# Patient Record
Sex: MALE | Race: AMERICAN INDIAN OR ALASKA NATIVE | NOT HISPANIC OR LATINO | URBAN - METROPOLITAN AREA
[De-identification: names, ages, dates, MRNs, and addresses within clinical notes are randomized per-mention and may not be internally consistent; named-entity substitution may affect disease eponyms.]

---

## 2018-09-14 ENCOUNTER — OFFICE VISIT - HEALTHEAST (OUTPATIENT)
Dept: GERIATRICS | Facility: CLINIC | Age: 53
End: 2018-09-14

## 2018-09-14 ENCOUNTER — AMBULATORY - HEALTHEAST (OUTPATIENT)
Dept: ADMINISTRATIVE | Facility: CLINIC | Age: 53
End: 2018-09-14

## 2018-09-14 DIAGNOSIS — G11.9 CEREBELLAR ATAXIA (H): ICD-10-CM

## 2018-09-14 DIAGNOSIS — Z72.0 TOBACCO ABUSE: ICD-10-CM

## 2018-09-14 DIAGNOSIS — R25.1 TREMOR: ICD-10-CM

## 2018-09-14 DIAGNOSIS — J69.0 ASPIRATION PNEUMONIA (H): ICD-10-CM

## 2018-09-14 DIAGNOSIS — G93.41 METABOLIC ENCEPHALOPATHY: ICD-10-CM

## 2018-09-14 DIAGNOSIS — F10.939 ALCOHOL WITHDRAWAL (H): ICD-10-CM

## 2018-09-17 ENCOUNTER — RECORDS - HEALTHEAST (OUTPATIENT)
Dept: LAB | Facility: CLINIC | Age: 53
End: 2018-09-17

## 2018-09-17 ENCOUNTER — OFFICE VISIT - HEALTHEAST (OUTPATIENT)
Dept: GERIATRICS | Facility: CLINIC | Age: 53
End: 2018-09-17

## 2018-09-17 DIAGNOSIS — F10.939 ALCOHOL WITHDRAWAL (H): ICD-10-CM

## 2018-09-17 DIAGNOSIS — N30.01 ACUTE CYSTITIS WITH HEMATURIA: ICD-10-CM

## 2018-09-17 DIAGNOSIS — G11.9 CEREBELLAR ATAXIA (H): ICD-10-CM

## 2018-09-17 DIAGNOSIS — Z72.0 TOBACCO ABUSE: ICD-10-CM

## 2018-09-17 DIAGNOSIS — E87.6 HYPOKALEMIA: ICD-10-CM

## 2018-09-17 DIAGNOSIS — R09.02 HYPOXIA: ICD-10-CM

## 2018-09-17 DIAGNOSIS — D53.9 MACROCYTIC ANEMIA: ICD-10-CM

## 2018-09-17 DIAGNOSIS — J69.0 ASPIRATION PNEUMONIA (H): ICD-10-CM

## 2018-09-17 DIAGNOSIS — R25.1 TREMOR: ICD-10-CM

## 2018-09-17 DIAGNOSIS — G93.41 METABOLIC ENCEPHALOPATHY: ICD-10-CM

## 2018-09-17 LAB
ALBUMIN SERPL-MCNC: 3.2 G/DL (ref 3.5–5)
ALP SERPL-CCNC: 204 U/L (ref 45–120)
ALT SERPL W P-5'-P-CCNC: 55 U/L (ref 0–45)
ANION GAP SERPL CALCULATED.3IONS-SCNC: 8 MMOL/L (ref 5–18)
AST SERPL W P-5'-P-CCNC: 114 U/L (ref 0–40)
BILIRUB SERPL-MCNC: 0.7 MG/DL (ref 0–1)
BUN SERPL-MCNC: 7 MG/DL (ref 8–22)
CALCIUM SERPL-MCNC: 9.7 MG/DL (ref 8.5–10.5)
CHLORIDE BLD-SCNC: 105 MMOL/L (ref 98–107)
CO2 SERPL-SCNC: 24 MMOL/L (ref 22–31)
CREAT SERPL-MCNC: 0.55 MG/DL (ref 0.7–1.3)
GFR SERPL CREATININE-BSD FRML MDRD: >60 ML/MIN/1.73M2
GLUCOSE BLD-MCNC: 84 MG/DL (ref 70–125)
HGB BLD-MCNC: 10.5 G/DL (ref 14–18)
POTASSIUM BLD-SCNC: 3.7 MMOL/L (ref 3.5–5)
PROT SERPL-MCNC: 8.1 G/DL (ref 6–8)
SODIUM SERPL-SCNC: 137 MMOL/L (ref 136–145)

## 2018-09-21 ENCOUNTER — OFFICE VISIT - HEALTHEAST (OUTPATIENT)
Dept: GERIATRICS | Facility: CLINIC | Age: 53
End: 2018-09-21

## 2018-09-21 DIAGNOSIS — F10.939 ALCOHOL WITHDRAWAL (H): ICD-10-CM

## 2018-09-21 DIAGNOSIS — Z72.0 TOBACCO ABUSE: ICD-10-CM

## 2018-09-21 DIAGNOSIS — J69.0 ASPIRATION PNEUMONIA (H): ICD-10-CM

## 2018-09-21 DIAGNOSIS — G11.9 CEREBELLAR ATAXIA (H): ICD-10-CM

## 2018-09-25 ENCOUNTER — AMBULATORY - HEALTHEAST (OUTPATIENT)
Dept: GERIATRICS | Facility: CLINIC | Age: 53
End: 2018-09-25

## 2021-06-02 VITALS — WEIGHT: 181.9 LBS | BODY MASS INDEX: 24.67 KG/M2

## 2021-06-02 VITALS — BODY MASS INDEX: 25.23 KG/M2 | WEIGHT: 186 LBS

## 2021-06-20 NOTE — PROGRESS NOTES
Code Status:  FULL CODE  Visit Type: H & P     Facility:  [facility]F [[id number]]      Facility Type: SNF (Skilled Nursing Facility, TCU)    History of Present Illness:   Hospital Admission Date: December 5, 2018 [facility] hospital Discharge Date: September 13, 2018  Facility Admission Date: December 13, 2018 [facility] transitional care unit    Tj Breaux is a 53 y.o. male patient was brought to the emergency department via EMS due to acute alcohol intoxication and confusion.  There he was found to be hypoxic with an O2 sat of 90% on 2 L in the chest x-ray demonstrated bilateral upper lobe infiltrates suggestive of aspiration pneumonitis.  He was given dose of IV ceftriaxone in the emergency department and was found to have elevated liver transaminases hypokalemia and macrocytic anemia.  His mother reported that she visits him daily and for the past 2 months he has been gradually declining.  He is not eaten lost weight and has been drinking approximately 1 L of [employer] per [employer] and smoking 1-2 packs of cigarettes a day.  He is disabled and is not worked for years.  His balance has significantly deteriorated.  He had a 2-1/2 year history of sobriety after participating in therapy at [facility] approximately 6 years ago.  He reports that he has had weeks of sobriety in the past year but his mother states that this is not true.  Only medicine he was taking was a steroid cream.    Hospital course; he was admitted with alcohol withdrawal acute treatment program called [facility].  This was only 24 hours for requirements for Valium.  He was treated for suspected aspiration pneumonitis and acute cystitis with Ceftin.  His urine culture grew out enterococcus.  He initially had some signs of urinary retention which was treated with him a lucent and he subsequently had been voiding well with no need for the 10 a lucent.  He received initial doses of rabies vaccine because of the  concern about bats in his house.  His delirium resolved but he continued to demonstrate extremely poor cerebellar ataxia.  He was polite and cooperative.    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      ETOH abuse      Gastritis      General weakness      Hypokalemia      Hypoxemia      Impulse disorder      Intracranial injury (H) 05/14/2008    with open intracranial wound     Macrocytic anemia      Major depressive disorder      Metabolic encephalopathy      Nicotine dependence      Pneumonitis     d/t inhalation of food and vomit - aspiration pneumonia     Psoriasis      Rabies exposure      Tobacco dependence      UTI (urinary tract infection)      Past Surgical History:   Procedure Laterality Date     jaw wiring  1994     SINUS SURGERY  10/11/2010     TONSILLECTOMY AND ADENOIDECTOMY       No family history on file.  Social History     Social History     Marital status: N/A     Spouse name: N/A     Number of children: N/A     Years of education: N/A     Occupational History     Not on file.     Social History Main Topics     Smoking status: Current Every Day Smoker     Packs/day: 0.50     Years: 30.00     Types: Cigarettes     Smokeless tobacco: Never Used     Alcohol use 105.0 oz/week     175 Shots of liquor per week      Comment: 1L whiskey per day     Drug use: Not on file     Sexual activity: Not on file     Other Topics Concern     Not on file     Social History Narrative     No narrative on file     Additional Geriatric Review patient lives alone in an apartment his mother visits him daily.  Has a stated past alcohol treatment with some  success and then recidivism.  He is disabled and has not worked for years.  He also has had a history of some rectal dysfunction.  Current Outpatient Prescriptions   Medication Sig Dispense Refill     acetaminophen (TYLENOL) 650 MG CR tablet Take 650 mg by mouth every 4 (four) hours as needed for pain.       multivitamin therapeutic tablet Take 1 tablet by mouth daily. Take for 30 days       nicotine (NICODERM CQ) 21 mg/24 hr Place 1 patch on the skin daily.        thiamine (VITAMIN B-1) 100 MG tablet Take 100 mg by mouth daily. Take for 30 days       No current facility-administered medications for this visit.      No Known Allergies    There is no immunization history on file for this patient.      Review of Systems   Constitutional: Negative for activity change, chills, diaphoresis, fatigue and fever.   HENT: Negative for ear pain, hearing loss, sinus pressure, sore throat and trouble swallowing.    Eyes: Negative for discharge and visual disturbance.   Respiratory: Negative for cough, shortness of breath and wheezing.    Cardiovascular: Negative for chest pain, palpitations and leg swelling.   Gastrointestinal: Negative for abdominal pain, constipation and diarrhea.   Endocrine: Negative for cold intolerance and heat intolerance.   Genitourinary: Negative for difficulty urinating, dysuria, flank pain, frequency and urgency.   Musculoskeletal: Negative for arthralgias, back pain, myalgias and neck stiffness.   Allergic/Immunologic: Negative for immunocompromised state.   Neurological: Negative for dizziness, tremors, syncope, speech difficulty, weakness, light-headedness, numbness and headaches.   Psychiatric/Behavioral: The patient is nervous/anxious.         Physical Exam   Constitutional: He is oriented to person, place, and time. He appears well-developed and well-nourished.   HENT:   Head: Normocephalic and atraumatic.   Right Ear: External ear normal.   Left Ear: External ear normal.   Nose: Nose normal.    Mouth/Throat: Oropharynx is clear and moist.   Eyes: Conjunctivae and EOM are normal. Pupils are equal, round, and reactive to light. Right eye exhibits no discharge. Left eye exhibits no discharge.   Neck: Neck supple. No JVD present. No tracheal deviation present. No thyromegaly present.   Cardiovascular: Regular rhythm, normal heart sounds and intact distal pulses.    No murmur heard.  Tachycardic   Pulmonary/Chest: Effort normal and breath sounds normal. No respiratory distress. He has no wheezes. He has no rales. He exhibits no tenderness.   Abdominal: He exhibits no distension and no mass. There is no tenderness. There is no guarding.   Musculoskeletal: Normal range of motion. He exhibits no edema or tenderness.   Lymphadenopathy:     He has no cervical adenopathy.   Neurological: He is alert and oriented to person, place, and time. He has normal reflexes. No cranial nerve deficit. Coordination normal.   Has a tremor more prominent in the right hand than the left.   Skin: Skin is warm and dry. No rash noted. No erythema.   Psychiatric: He has a normal mood and affect. His behavior is normal. Thought content normal.   .  Staff is observed being confused         Labs:  All labs reviewed in the nursing home record.    Assessment:  1. Alcohol withdrawal (H)     2. Cerebellar ataxia (H)     3. Aspiration pneumonia (H)     4. Tobacco abuse     5. Metabolic encephalopathy     6. Tremor     7. Macrocytic anemia     8. Acute cystitis with hematuria     9. Hypoxia     10. Hypokalemia         Plan: Patient may still be some aspects of withdrawal.  We will at this juncture add Ativan 0.5 mg 1 every 12 hours scheduled and every 6 hours as needed anxiety he is making progress with his gait instability and for that reason we will continue this .    Total 45 minutes of which 65% was spent in counseling and coordination of care of the above plan.    Electronically signed by: Gerardo Colorado MD

## 2021-06-20 NOTE — PROGRESS NOTES
[facility]    [facility] [[id number][facility]   Code Status: FULL CODE  PCP: No Primary Care Provider   Phone: None   Fax: [phone]      CHIEF COMPLAINT/REASON FOR VISIT:  Chief Complaint   Patient presents with     Discharge Summary       HISTORY COURSE:  Tj is a 53 y.o. male who who I was asked to see secondary to recent transitional care admission from his hospital admission September 5 - September 13, 2018 secondary to alcohol withdrawal with delirium along with a history of cerebellar ataxia also secondary to alcohol dependence along with metabolic encephalopathy and acute alcohol intoxication and apparently there is a risk of rabies exposure at home.  He does have a history of macrocytic anemia his hemoglobin on September 5 9.3 also diagnosed with acute cystitis with hematuria with enterococcus species.  His liver enzymes on September 5 did show albumin at 3.6 bili total 0.8 alkaline phosphatase 302  and ALT normal at 23.  His chest x-ray did show mild patchy opacities in both lungs prominent in the right upper lobe and left lung base suspicious for infectious versus inflammatory process but no pleural effusion.  His personal history of alcoholism dates that he drinks about 1 L of [employer] daily he also does have tobacco dependence smoking anywhere from 1-2 packs per day.  In the hospital he was treated for suspected aspiration pneumonitis along with acute cystitis with Ceftin.  He also did have urinary retention he did not require any Flomax.  He will need rabies vaccine 1 more dose on September 19.  During his stay he did have a clinical nutrition assessment as well looking at his alcohol intoxication and evaluating his overall nutritional status.  He has not worked in many years apparently has gone through treatment modalities and programs.  On September 5 sodium 143 potassium 3.2 BUN 8 creatinine 0.54 hemoglobin 9.3 white cells 5.8  platelets 150.  The drug abuse screen did show presumptive THC.  CT of the head secondary to confusion did not show any acute infarcts he does have mild cerebral and cerebellar volume loss and chronic small vessel ischemia.  Cervical spine CT did not show any acute hemorrhage midline shift or masses.  CT of the brain did not show any acute infarcts.  He has been able to participate with the rehabilitation services.  I did ask speech and language pathology to also consult with him regarding cognition to see where he is at I do not have any of those reports.  He has been normotensive and afebrile.  Did have laboratory studies performed including a CMP and hemoglobin on September 17 see results below.  Did add folic acid 1 mg to his current medication regimen.  He otherwise has been pretty independent up to this point his appetite is good is sleeping well at night not having any discomfort.  Review of Systems  Currently denies chills and fever coughing wheezing chest pain dizziness vertigo nausea vomiting diarrhea dysuria headache stiff neck swollen glands rashes sores or myalgias.  History of macrocytic anemia tobacco disorder alcohol abuse  Vitals:    09/21/18 0849   BP: 118/76   Pulse: 78   Resp: 14   Temp: 98.6  F (37  C)   SpO2: 99%   Weight: 186 lb (84.4 kg)       Physical Exam  Constitutional: No distress.   HENT:   Neck: Neck supple. No thyromegaly present.   Cardiovascular: Normal rate, regular rhythm and normal heart sounds.    Pulmonary/Chest: Breath sounds normal.   Abdominal: Bowel sounds are normal. There is no tenderness. There is no guarding.   Musculoskeletal:   Cerebellar ataxia.  Tremors.   Lymphadenopathy:     He has no cervical adenopathy.   Neurological: He is alert.   Skin: Skin is warm and dry. No rash noted.   Psychiatric: His behavior is normal.   History of MDD.  History of anxiety.   Lab Results   Component Value Date    HGB 10.5 (L) 09/17/2018     Results for orders placed or performed in  visit on 09/17/18   Comprehensive Metabolic Panel   Result Value Ref Range    Sodium 137 136 - 145 mmol/L    Potassium 3.7 3.5 - 5.0 mmol/L    Chloride 105 98 - 107 mmol/L    CO2 24 22 - 31 mmol/L    Anion Gap, Calculation 8 5 - 18 mmol/L    Glucose 84 70 - 125 mg/dL    BUN 7 (L) 8 - 22 mg/dL    Creatinine 0.55 (L) 0.70 - 1.30 mg/dL    GFR MDRD Af Amer >60 >60 mL/min/1.73m2    GFR MDRD Non Af Amer >60 >60 mL/min/1.73m2    Bilirubin, Total 0.7 0.0 - 1.0 mg/dL    Calcium 9.7 8.5 - 10.5 mg/dL    Protein, Total 8.1 (H) 6.0 - 8.0 g/dL    Albumin 3.2 (L) 3.5 - 5.0 g/dL    Alkaline Phosphatase 204 (H) 45 - 120 U/L     (H) 0 - 40 U/L    ALT 55 (H) 0 - 45 U/L       MEDICATION LIST:  Current Outpatient Prescriptions   Medication Sig     folic acid (FOLVITE) 1 MG tablet Take 1 mg by mouth daily.     acetaminophen (TYLENOL) 650 MG CR tablet Take 650 mg by mouth every 4 (four) hours as needed for pain.     multivitamin therapeutic tablet Take 1 tablet by mouth daily. Take for 30 days     nicotine (NICODERM CQ) 21 mg/24 hr Place 1 patch on the skin daily.      thiamine (VITAMIN B-1) 100 MG tablet Take 100 mg by mouth daily. Take for 30 days       DISCHARGE DIAGNOSIS:    ICD-10-CM    1. Alcohol withdrawal (H) F10.239    2. Aspiration pneumonia (H) J69.0    3. Cerebellar ataxia (H) G11.9    4. Tobacco abuse Z72.0        MEDICAL EQUIPMENT NEEDS:  None    DISCHARGE PLAN/FACE TO FACE:  I certify that services are/were furnished while this patient was under the care of a physician and that a physician or an allowed non-physician practitioner (NPP), had a face-to-face encounter that meets the physician face-to-face encounter requirements. The encounter was in whole, or in part, related to the primary reason for home health. The patient is confined to his/her home and needs intermittent skilled nursing, physical therapy, speech-language pathology, or the continued need for occupational therapy. A plan of care has been established  by a physician and is periodically reviewed by a physician.  Date of Face-to-Face Encounter: September 21, 2018    I certify that, based on my findings, the following services are medically necessary home health services: He will be discharging perhaps to a hotel in [place] on Monday, September 24, 2018 with current medications    My clinical findings support the need for the above skilled services because: (Please write a brief narrative summary that describes what the RN, PT, SLP, or other services will be doing in the home. A list of diagnoses in this section does not meet the CMS requirements.)  He will not require any skilled services    This patient is homebound because: (Please write a brief narrative summary describing the functional limitations as to why this patient is homebound and specifically what makes this patient homebound.)  He will not be homebound    The patient is, or has been, under my care and I have initiated the establishment of the plan of care. This patient will be followed by a physician who will periodically review the plan of care.    Schedule follow up visit with primary care provider within 7 days to reestablish care.  He will follow-up with his primary care doctor regarding medication management and any future laboratory studies and certainly any alcohol treatment modalities and suggestions.  At this point they are most likely sending him to a hotel in [place].  Apparently he has been living with his mom.  He did finish up rabies injection secondary to a rabies exposure from baths but at this point there was no evidence of any bite infestation.  He otherwise has been asymptomatic.  He has been in pretty good spirits.  His appetite is been appropriate.  He did not have any other further questions.  He is also aware of smoking cessation modalities and he is also aware once again of strong attempts and encouragement to find a treatment center.    Discharge coordination care greater  than 30 minutes.  Electronically signed by: Michael Duane Johnson, CNP

## 2021-06-20 NOTE — PROGRESS NOTES
[facility]      Facility:    [facility] [[id number]]  Code Status: FULL CODE      Chief Complaint/Reason for Visit:  Chief Complaint   Patient presents with     Problem Visit     asked to see       HPI:   Tj is a 53 y.o. male who who I was asked to see secondary to recent transitional care admission from his hospital admission September 5 - September 13, 2018 secondary to alcohol withdrawal with delirium along with a history of cerebellar ataxia also secondary to alcohol dependence along with metabolic encephalopathy and acute alcohol intoxication and apparently there is a risk of rabies exposure at home.  He does have a history of macrocytic anemia his hemoglobin on September 5 9.3 also diagnosed with acute cystitis with hematuria with enterococcus species.  His liver enzymes on September 5 did show albumin at 3.6 bili total 0.8 alkaline phosphatase 302  and ALT normal at 23.  His chest x-ray did show mild patchy opacities in both lungs prominent in the right upper lobe and left lung base suspicious for infectious versus inflammatory process but no pleural effusion.  His personal history of alcoholism dates that he drinks about 1 L of [employer] daily he also does have tobacco dependence smoking anywhere from 1-2 packs per day.  In the hospital he was treated for suspected aspiration pneumonitis along with acute cystitis with Ceftin.  He also did have urinary retention he did not require any Flomax.  He will need rabies vaccine 1 more dose on September 19.  During his stay he did have a clinical nutrition assessment as well looking at his alcohol intoxication and evaluating his overall nutritional status.  He has not worked in many years apparently has gone through treatment modalities and programs.  On September 5 sodium 143 potassium 3.2 BUN 8 creatinine 0.54 hemoglobin 9.3 white cells 5.8 platelets 150.  The drug abuse screen did show presumptive THC.   CT of the head secondary to confusion did not show any acute infarcts he does have mild cerebral and cerebellar volume loss and chronic small vessel ischemia.  Cervical spine CT did not show any acute hemorrhage midline shift or masses.  CT of the brain did not show any acute infarcts.  He currently is on the transitional care unit we talked about the care on the transitional care unit and I did observe him ambulate.  He does have a cerebellar ataxia along with tremors.  He is able at this point to ambulate without any assistive device.  He has been normotensive afebrile and also on room air.  Is on a couple vitamins and also a nicotine patch but he does go out and smoke.  Not having any discomfort.  He is having a couple of stools per day anywhere from 3-4.  We did talk about his laboratory studies and also repeating an rechecking his CMP and hemoglobin on Monday, September 17.  Will also get therapy involved including cognitive testing.  Past Medical History:  No past medical history on file.  Alcohol abuse.  Tobacco abuse.  Macrocytic anemia.  Impulse control disorder.  Anxiety.  Major depressive disorder history of intracranial injury with multiple skull and facial fractures.  Gastritis.  Surgical History:  No past surgical history on file.  Job wiring.  Sinus surgery bilateral endoscopic frontal maxillary sphenoid and anterior ethmoid.  Tonsillectomy and adenoidectomy  Family History:   No family history on file.    Social History:    Social History     Social History     Marital status: N/A     Spouse name: N/A     Number of children: N/A     Years of education: N/A     Social History Main Topics     Smoking status: Not on file     Smokeless tobacco: Not on file     Alcohol use Not on file     Drug use: Not on file     Sexual activity: Not on file     Other Topics Concern     Not on file     Social History Narrative          Review of Systems  Currently denies chills and fever coughing wheezing chest pain  dizziness vertigo nausea vomiting diarrhea dysuria headache stiff neck swollen glands rashes sores or myalgias.  History of macrocytic anemia tobacco disorder alcohol abuse  Vitals:    09/14/18 0949   BP: 99/68   Pulse: 98   Resp: 18   Temp: 99  F (37.2  C)   SpO2: 98%       Physical Exam   Constitutional: No distress.   HENT:   Head: Normocephalic.   Eyes: Pupils are equal, round, and reactive to light.   Neck: Neck supple. No thyromegaly present.   Cardiovascular: Normal rate, regular rhythm and normal heart sounds.    Pulmonary/Chest: Breath sounds normal.   Abdominal: Bowel sounds are normal. There is no tenderness. There is no guarding.   Musculoskeletal:   Cerebellar ataxia.  Tremors.   Lymphadenopathy:     He has no cervical adenopathy.   Neurological: He is alert.   Skin: Skin is warm and dry. No rash noted.   Psychiatric: His behavior is normal.   History of MDD.  History of anxiety.       Medication List:  Current Outpatient Prescriptions   Medication Sig     acetaminophen (TYLENOL) 650 MG CR tablet Take 650 mg by mouth every 4 (four) hours as needed for pain.     folic acid (FOLVITE) 1 MG tablet Take 1 mg by mouth daily.     multivitamin with minerals (THERA-M) 9 mg iron-400 mcg Tab tablet Take 1 tablet by mouth daily.     nicotine (NICODERM CQ) 21 mg/24 hr Place 1 patch on the skin daily.     thiamine 100 MG tablet Take 100 mg by mouth daily.       Labs:  No results found for: WBC, HGB, HCT, MCV, PLT  No results found for this or any previous visit.          Assessment:    ICD-10-CM    1. Alcohol withdrawal (H) F10.239    2. Cerebellar ataxia (H) G11.9    3. Aspiration pneumonia (H) J69.0    4. Tobacco abuse Z72.0    5. Metabolic encephalopathy G93.41    6. Tremor R25.1        Plan:  Discussed the role of the transitional care including therapy.  Adding folic acid 1 mg daily getting speech involved secondary to cognition also getting a CMP and hemoglobin on Monday, September 17.  We will see where things  lie from here and continue to manage and follow.  He had no further questions.    For documentation purposes total visit was over 40 minutes to which over 50% was spent with the patient going over his care medications hospitalizations upcoming laboratory studies and coordination of care efforts.      Electronically signed by: Michael Duane Johnson, CNP

## 2024-07-26 ENCOUNTER — LAB REQUISITION (OUTPATIENT)
Dept: LAB | Facility: CLINIC | Age: 59
End: 2024-07-26

## 2024-07-26 DIAGNOSIS — D64.9 ANEMIA, UNSPECIFIED: ICD-10-CM

## 2024-07-29 LAB
ANION GAP SERPL CALCULATED.3IONS-SCNC: 13 MMOL/L (ref 7–15)
BUN SERPL-MCNC: 17.8 MG/DL (ref 6–20)
CALCIUM SERPL-MCNC: 7.2 MG/DL (ref 8.8–10.4)
CHLORIDE SERPL-SCNC: 95 MMOL/L (ref 98–107)
CREAT SERPL-MCNC: 0.87 MG/DL (ref 0.67–1.17)
EGFRCR SERPLBLD CKD-EPI 2021: >90 ML/MIN/1.73M2
ERYTHROCYTE [DISTWIDTH] IN BLOOD BY AUTOMATED COUNT: 20.8 % (ref 10–15)
GLUCOSE SERPL-MCNC: 138 MG/DL (ref 70–99)
HCO3 SERPL-SCNC: 31 MMOL/L (ref 22–29)
HCT VFR BLD AUTO: 25.7 % (ref 40–53)
HGB BLD-MCNC: 8.4 G/DL (ref 13.3–17.7)
MCH RBC QN AUTO: 30.7 PG (ref 26.5–33)
MCHC RBC AUTO-ENTMCNC: 32.7 G/DL (ref 31.5–36.5)
MCV RBC AUTO: 94 FL (ref 78–100)
PLATELET # BLD AUTO: 72 10E3/UL (ref 150–450)
POTASSIUM SERPL-SCNC: 2.8 MMOL/L (ref 3.4–5.3)
RBC # BLD AUTO: 2.74 10E6/UL (ref 4.4–5.9)
SODIUM SERPL-SCNC: 139 MMOL/L (ref 135–145)
WBC # BLD AUTO: 4.2 10E3/UL (ref 4–11)

## 2024-07-29 PROCEDURE — P9604 ONE-WAY ALLOW PRORATED TRIP: HCPCS | Performed by: INTERNAL MEDICINE

## 2024-07-29 PROCEDURE — 85014 HEMATOCRIT: CPT | Performed by: INTERNAL MEDICINE

## 2024-07-29 PROCEDURE — 82374 ASSAY BLOOD CARBON DIOXIDE: CPT | Performed by: INTERNAL MEDICINE

## 2024-07-29 PROCEDURE — 82947 ASSAY GLUCOSE BLOOD QUANT: CPT | Performed by: INTERNAL MEDICINE

## 2024-07-29 PROCEDURE — 82565 ASSAY OF CREATININE: CPT | Performed by: INTERNAL MEDICINE

## 2024-07-29 PROCEDURE — 36415 COLL VENOUS BLD VENIPUNCTURE: CPT | Performed by: INTERNAL MEDICINE

## 2024-07-30 ENCOUNTER — LAB REQUISITION (OUTPATIENT)
Dept: LAB | Facility: CLINIC | Age: 59
End: 2024-07-30

## 2024-07-30 DIAGNOSIS — E87.6 HYPOKALEMIA: ICD-10-CM

## 2024-08-01 LAB — POTASSIUM SERPL-SCNC: 2.9 MMOL/L (ref 3.4–5.3)

## 2024-08-01 PROCEDURE — 84132 ASSAY OF SERUM POTASSIUM: CPT | Performed by: INTERNAL MEDICINE

## 2024-08-01 PROCEDURE — P9604 ONE-WAY ALLOW PRORATED TRIP: HCPCS | Performed by: INTERNAL MEDICINE

## 2024-08-01 PROCEDURE — 36415 COLL VENOUS BLD VENIPUNCTURE: CPT | Performed by: INTERNAL MEDICINE

## 2024-08-03 ENCOUNTER — LAB REQUISITION (OUTPATIENT)
Dept: LAB | Facility: CLINIC | Age: 59
End: 2024-08-03

## 2024-08-06 LAB — POTASSIUM SERPL-SCNC: 4 MMOL/L (ref 3.4–5.3)

## 2024-08-06 PROCEDURE — 36415 COLL VENOUS BLD VENIPUNCTURE: CPT

## 2024-08-06 PROCEDURE — 84132 ASSAY OF SERUM POTASSIUM: CPT

## 2024-08-06 PROCEDURE — P9604 ONE-WAY ALLOW PRORATED TRIP: HCPCS
